# Patient Record
Sex: MALE | Race: WHITE | NOT HISPANIC OR LATINO | Employment: FULL TIME | URBAN - NONMETROPOLITAN AREA
[De-identification: names, ages, dates, MRNs, and addresses within clinical notes are randomized per-mention and may not be internally consistent; named-entity substitution may affect disease eponyms.]

---

## 2023-07-28 ENCOUNTER — TELEPHONE (OUTPATIENT)
Dept: CARDIOLOGY | Facility: CLINIC | Age: 58
End: 2023-07-28
Payer: COMMERCIAL

## 2023-07-28 ENCOUNTER — OFFICE VISIT (OUTPATIENT)
Dept: CARDIOLOGY | Facility: CLINIC | Age: 58
End: 2023-07-28
Payer: COMMERCIAL

## 2023-07-28 VITALS
HEART RATE: 81 BPM | WEIGHT: 134 LBS | HEIGHT: 69 IN | OXYGEN SATURATION: 96 % | SYSTOLIC BLOOD PRESSURE: 150 MMHG | BODY MASS INDEX: 19.85 KG/M2 | DIASTOLIC BLOOD PRESSURE: 96 MMHG

## 2023-07-28 DIAGNOSIS — R06.09 DYSPNEA ON EXERTION: Primary | ICD-10-CM

## 2023-07-28 DIAGNOSIS — I10 ESSENTIAL HYPERTENSION: ICD-10-CM

## 2023-07-28 DIAGNOSIS — Z72.0 TOBACCO USE: ICD-10-CM

## 2023-07-28 DIAGNOSIS — R07.9 CHEST PAIN, UNSPECIFIED TYPE: ICD-10-CM

## 2023-07-28 PROBLEM — G43.909 MIGRAINE HEADACHE: Status: ACTIVE | Noted: 2023-07-28

## 2023-07-28 PROCEDURE — 99204 OFFICE O/P NEW MOD 45 MIN: CPT | Performed by: INTERNAL MEDICINE

## 2023-07-28 PROCEDURE — 93000 ELECTROCARDIOGRAM COMPLETE: CPT | Performed by: INTERNAL MEDICINE

## 2023-07-28 NOTE — LETTER
July 28, 2023     Sergio Westbrook DO  [address] [facility] [place] [address]    Patient: Hiro Jerez   YOB: 1965   Date of Visit: 7/28/2023       Dear Sergio Westbrook DO,    Thank you for referring Hiro Jerez to me for evaluation. Below is a copy of my consult note.    If you have questions, please do not hesitate to call me. I look forward to following Hiro along with you.         Sincerely,        Dusty Jesus MD        CC: RAFFAELE Chen      Reason for Visit: Fatigue, shortness of breath.    HPI:  Hiro Jerez is a 57 y.o. male is being seen for consultation today at the request of Jennifer Wilson*RAFFAELE, for fatigue and shortness of breath.  He also reports intermittent chest pain symptoms.  He has a significant past medical history of migraine headaches and tobacco use.  He has started feeling bad over the past 2 months.  He had an episode where he broke out in a sweat, looked pale, and started vomiting.  The fatigue has been present over the past 6 months.  His abdomen has been swelling.  Had recent blood work done at [facility] [place].      Previous Cardiac Testing and Procedures:  -None available    Patient Active Problem List   Diagnosis   • Migraine headache   • Tobacco use       Social History     Tobacco Use   • Smoking status: Every Day     Packs/day: 1.50     Years: 45.00     Pack years: 67.50     Types: Cigarettes   • Smokeless tobacco: Never   Vaping Use   • Vaping Use: Never used   Substance Use Topics   • Alcohol use: Yes     Comment: Beer every now and again   • Drug use: Never       Family History   Problem Relation Age of Onset   • Heart attack Father        The following portions of the patient's history were reviewed and updated as appropriate: allergies, current medications, past family history, past medical history, past social history, past surgical history, and problem list.    No current outpatient medications on file.    Review of  "Systems   Constitutional: Positive for diaphoresis and malaise/fatigue. Negative for chills and fever.   Cardiovascular:  Positive for chest pain and dyspnea on exertion. Negative for paroxysmal nocturnal dyspnea.   Respiratory:  Positive for shortness of breath. Negative for cough.    Skin:  Negative for rash.   Gastrointestinal:  Positive for nausea and vomiting. Negative for abdominal pain and heartburn.   Neurological:  Negative for dizziness and numbness.     Objective  /96   Pulse 81   Ht 175.3 cm (69\")   Wt 60.8 kg (134 lb)   SpO2 96%   BMI 19.79 kg/m²   Constitutional:       Appearance: Well-developed.   HENT:      Head: Normocephalic and atraumatic.   Pulmonary:      Effort: Pulmonary effort is normal.      Breath sounds: Normal breath sounds.   Cardiovascular:      Normal rate. Regular rhythm.      Murmurs: There is no murmur.      No gallop.  No click.   Edema:     Peripheral edema absent.   Skin:     General: Skin is warm and dry.   Neurological:      Mental Status: Alert and oriented to person, place, and time.       ECG 12 Lead    Date/Time: 7/28/2023 11:44 AM  Performed by: Dusty Jesus MD  Authorized by: Dusty Jesus MD   Previous ECG: no previous ECG available  Rhythm: sinus rhythm  Rate: normal  Conduction: left anterior fascicular block          ICD-10-CM ICD-9-CM   1. Dyspnea on exertion  R06.09 786.09   2. Chest pain, unspecified type  R07.9 786.50   3. Essential hypertension  I10 401.9   4. Tobacco use  Z72.0 305.1         Assessment/Plan:  1.  Dyspnea on exertion: Patient reports significantly increased dyspnea on exertion, fatigue, and chest pain.  Evaluate further with an echocardiogram.    2.  Chest pain: Symptoms concerning for angina.  High risk for coronary artery disease given his heavy smoking.  Nuclear stress test ordered for further evaluation.  Obtain copy of lipid panel from PCP.    3.  Essential hypertension: Blood pressure is significantly elevated today.  He " denies any prior diagnosis of hypertension, but does not go to the doctor regularly.  Keep a blood pressure log at home and bring to follow-up.  If remains elevated will plan to start antihypertensive medications.    4.  Tobacco use: Hiro Jerez  reports that he has been smoking cigarettes. He has a 67.50 pack-year smoking history. He has never used smokeless tobacco.. I have educated him on the risk of diseases from using tobacco products such as cancer, COPD, and heart disease.  I advised him to quit and he is not willing to quit.  I spent 3.5 minutes counseling the patient.

## 2023-07-28 NOTE — PROGRESS NOTES
Reason for Visit: Fatigue, shortness of breath.    HPI:  Hiro Jerez is a 57 y.o. male is being seen for consultation today at the request of Jennifer Wilson*, RAFFAELE, for fatigue and shortness of breath.  He also reports intermittent chest pain symptoms.  He has a significant past medical history of migraine headaches and tobacco use.  He has started feeling bad over the past 2 months.  He had an episode where he broke out in a sweat, looked pale, and started vomiting.  The fatigue has been present over the past 6 months.  His abdomen has been swelling.  Had recent blood work done at [facility] [place].      Previous Cardiac Testing and Procedures:  -None available    Patient Active Problem List   Diagnosis    Migraine headache    Tobacco use       Social History     Tobacco Use    Smoking status: Every Day     Packs/day: 1.50     Years: 45.00     Pack years: 67.50     Types: Cigarettes    Smokeless tobacco: Never   Vaping Use    Vaping Use: Never used   Substance Use Topics    Alcohol use: Yes     Comment: Beer every now and again    Drug use: Never       Family History   Problem Relation Age of Onset    Heart attack Father        The following portions of the patient's history were reviewed and updated as appropriate: allergies, current medications, past family history, past medical history, past social history, past surgical history, and problem list.    No current outpatient medications on file.    Review of Systems   Constitutional: Positive for diaphoresis and malaise/fatigue. Negative for chills and fever.   Cardiovascular:  Positive for chest pain and dyspnea on exertion. Negative for paroxysmal nocturnal dyspnea.   Respiratory:  Positive for shortness of breath. Negative for cough.    Skin:  Negative for rash.   Gastrointestinal:  Positive for nausea and vomiting. Negative for abdominal pain and heartburn.   Neurological:  Negative for dizziness and numbness.     Objective   /96   Pulse 81   Ht 175.3  "cm (69\")   Wt 60.8 kg (134 lb)   SpO2 96%   BMI 19.79 kg/m²   Constitutional:       Appearance: Well-developed.   HENT:      Head: Normocephalic and atraumatic.   Pulmonary:      Effort: Pulmonary effort is normal.      Breath sounds: Normal breath sounds.   Cardiovascular:      Normal rate. Regular rhythm.      Murmurs: There is no murmur.      No gallop.  No click.   Edema:     Peripheral edema absent.   Skin:     General: Skin is warm and dry.   Neurological:      Mental Status: Alert and oriented to person, place, and time.       ECG 12 Lead    Date/Time: 7/28/2023 11:44 AM  Performed by: Dusty Jesus MD  Authorized by: Dusty Jesus MD   Previous ECG: no previous ECG available  Rhythm: sinus rhythm  Rate: normal  Conduction: left anterior fascicular block          ICD-10-CM ICD-9-CM   1. Dyspnea on exertion  R06.09 786.09   2. Chest pain, unspecified type  R07.9 786.50   3. Essential hypertension  I10 401.9   4. Tobacco use  Z72.0 305.1         Assessment/Plan:  1.  Dyspnea on exertion: Patient reports significantly increased dyspnea on exertion, fatigue, and chest pain.  Evaluate further with an echocardiogram.    2.  Chest pain: Symptoms concerning for angina.  High risk for coronary artery disease given his heavy smoking.  Nuclear stress test ordered for further evaluation.  Obtain copy of lipid panel from PCP.    3.  Essential hypertension: Blood pressure is significantly elevated today.  He denies any prior diagnosis of hypertension, but does not go to the doctor regularly.  Keep a blood pressure log at home and bring to follow-up.  If remains elevated will plan to start antihypertensive medications.    4.  Tobacco use: Hiro Jerez  reports that he has been smoking cigarettes. He has a 67.50 pack-year smoking history. He has never used smokeless tobacco.. I have educated him on the risk of diseases from using tobacco products such as cancer, COPD, and heart disease.  I advised him to quit and " he is not willing to quit.  I spent 3.5 minutes counseling the patient.

## 2023-07-28 NOTE — TELEPHONE ENCOUNTER
----- Message from Dusty Jesus MD sent at 7/28/2023  3:36 PM CDT -----  Please obtain copy of most recent blood work from PCP.  Most importantly need last lipid panel.

## 2023-07-28 NOTE — TELEPHONE ENCOUNTER
Record request for the most recent lab result and the most recent lipid result is faxed to PCP.  Nohemi Durham MA

## 2023-08-16 NOTE — TELEPHONE ENCOUNTER
Called PCP to check on status of lab result request.  They state the patient has never had any labs done through their office.  Nohemi Durham MA

## 2023-08-28 ENCOUNTER — TELEPHONE (OUTPATIENT)
Dept: CARDIOLOGY | Facility: CLINIC | Age: 58
End: 2023-08-28
Payer: COMMERCIAL

## 2023-08-28 NOTE — TELEPHONE ENCOUNTER
Pt's insurance is asking why a plain treadmill cannot be ordered for pt first.  Is there any reason he cannot have a TST instead of the nuclear?  Thanks!

## 2023-09-05 ENCOUNTER — HOSPITAL ENCOUNTER (OUTPATIENT)
Dept: CARDIOLOGY | Facility: HOSPITAL | Age: 58
Discharge: HOME | End: 2023-09-05
Payer: COMMERCIAL

## 2023-09-05 ENCOUNTER — HOSPITAL ENCOUNTER (OUTPATIENT)
Dept: CARDIOLOGY | Facility: HOSPITAL | Age: 58
Discharge: HOME | End: 2023-09-05
Admitting: INTERNAL MEDICINE
Payer: COMMERCIAL

## 2023-09-05 VITALS
BODY MASS INDEX: 19.85 KG/M2 | WEIGHT: 134 LBS | SYSTOLIC BLOOD PRESSURE: 150 MMHG | HEIGHT: 69 IN | DIASTOLIC BLOOD PRESSURE: 96 MMHG

## 2023-09-05 VITALS
SYSTOLIC BLOOD PRESSURE: 130 MMHG | HEART RATE: 76 BPM | BODY MASS INDEX: 19.85 KG/M2 | HEIGHT: 69 IN | DIASTOLIC BLOOD PRESSURE: 85 MMHG | WEIGHT: 134 LBS

## 2023-09-05 LAB
BH CV ECHO MEAS - AO MAX PG: 13.2 MMHG
BH CV ECHO MEAS - AO MEAN PG: 7 MMHG
BH CV ECHO MEAS - AO ROOT DIAM: 3.2 CM
BH CV ECHO MEAS - AO V2 MAX: 182 CM/SEC
BH CV ECHO MEAS - AO V2 VTI: 36.4 CM
BH CV ECHO MEAS - AVA(I,D): 1.87 CM2
BH CV ECHO MEAS - EDV(CUBED): 73.6 ML
BH CV ECHO MEAS - EDV(MOD-SP2): 74.6 ML
BH CV ECHO MEAS - EDV(MOD-SP4): 63.2 ML
BH CV ECHO MEAS - EF(MOD-BP): 48.3 %
BH CV ECHO MEAS - EF(MOD-SP2): 52.5 %
BH CV ECHO MEAS - EF(MOD-SP4): 47.2 %
BH CV ECHO MEAS - ESV(CUBED): 23.1 ML
BH CV ECHO MEAS - ESV(MOD-SP2): 35.4 ML
BH CV ECHO MEAS - ESV(MOD-SP4): 33.4 ML
BH CV ECHO MEAS - FS: 32 %
BH CV ECHO MEAS - IVS/LVPW: 0.98 CM
BH CV ECHO MEAS - IVSD: 0.78 CM
BH CV ECHO MEAS - LA DIMENSION: 3.4 CM
BH CV ECHO MEAS - LAT PEAK E' VEL: 9.8 CM/SEC
BH CV ECHO MEAS - LV DIASTOLIC VOL/BSA (35-75): 36.3 CM2
BH CV ECHO MEAS - LV MASS(C)D: 98.1 GRAMS
BH CV ECHO MEAS - LV MAX PG: 3.2 MMHG
BH CV ECHO MEAS - LV MEAN PG: 2 MMHG
BH CV ECHO MEAS - LV SYSTOLIC VOL/BSA (12-30): 19.2 CM2
BH CV ECHO MEAS - LV V1 MAX: 89.1 CM/SEC
BH CV ECHO MEAS - LV V1 VTI: 17.9 CM
BH CV ECHO MEAS - LVIDD: 4.2 CM
BH CV ECHO MEAS - LVIDS: 2.9 CM
BH CV ECHO MEAS - LVOT AREA: 3.8 CM2
BH CV ECHO MEAS - LVOT DIAM: 2.2 CM
BH CV ECHO MEAS - LVPWD: 0.79 CM
BH CV ECHO MEAS - MED PEAK E' VEL: 8.9 CM/SEC
BH CV ECHO MEAS - MV A MAX VEL: 65.6 CM/SEC
BH CV ECHO MEAS - MV DEC TIME: 0.15 MSEC
BH CV ECHO MEAS - MV E MAX VEL: 76.7 CM/SEC
BH CV ECHO MEAS - MV E/A: 1.17
BH CV ECHO MEAS - MV MAX PG: 4 MMHG
BH CV ECHO MEAS - MV MEAN PG: 2 MMHG
BH CV ECHO MEAS - MV V2 VTI: 25.6 CM
BH CV ECHO MEAS - MVA(VTI): 2.7 CM2
BH CV ECHO MEAS - PA V2 MAX: 88.6 CM/SEC
BH CV ECHO MEAS - RAP SYSTOLE: 5 MMHG
BH CV ECHO MEAS - RV MAX PG: 1.6 MMHG
BH CV ECHO MEAS - RV V1 MAX: 63.3 CM/SEC
BH CV ECHO MEAS - RV V1 VTI: 13 CM
BH CV ECHO MEAS - RVDD: 3 CM
BH CV ECHO MEAS - RVSP: 16 MMHG
BH CV ECHO MEAS - SI(MOD-SP2): 22.5 ML/M2
BH CV ECHO MEAS - SI(MOD-SP4): 17.1 ML/M2
BH CV ECHO MEAS - SV(LVOT): 68 ML
BH CV ECHO MEAS - SV(MOD-SP2): 39.2 ML
BH CV ECHO MEAS - SV(MOD-SP4): 29.8 ML
BH CV ECHO MEAS - TAPSE (>1.6): 2.03 CM
BH CV ECHO MEAS - TR MAX PG: 11 MMHG
BH CV ECHO MEAS - TR MAX VEL: 166 CM/SEC
BH CV ECHO MEASUREMENTS AVERAGE E/E' RATIO: 8.2
BH CV STRESS BP STAGE 1: NORMAL
BH CV STRESS BP STAGE 2: NORMAL
BH CV STRESS COMMENTS STAGE 1: NORMAL
BH CV STRESS DOSE REGADENOSON STAGE 1: 0.4
BH CV STRESS DURATION MIN STAGE 1: 3
BH CV STRESS DURATION MIN STAGE 2: 2
BH CV STRESS DURATION SEC STAGE 1: 0
BH CV STRESS DURATION SEC STAGE 2: 32
BH CV STRESS GRADE STAGE 1: 10
BH CV STRESS GRADE STAGE 2: 12
BH CV STRESS HR STAGE 1: 119
BH CV STRESS HR STAGE 2: 140
BH CV STRESS METS STAGE 1: 5
BH CV STRESS METS STAGE 2: 7.5
BH CV STRESS PROTOCOL 1: NORMAL
BH CV STRESS RECOVERY BP: NORMAL MMHG
BH CV STRESS RECOVERY HR: 88 BPM
BH CV STRESS SPEED STAGE 1: 1.7
BH CV STRESS SPEED STAGE 2: 2.5
BH CV STRESS STAGE 1: 1
BH CV STRESS STAGE 2: 2
BH CV XLRA - TDI S': 10.1 CM/SEC
LEFT ATRIUM VOLUME INDEX: 20.2 ML/M2
LEFT ATRIUM VOLUME: 37.3 ML
LV EF NUC BP: 62 %
MAXIMAL PREDICTED HEART RATE: 161 BPM
PERCENT MAX PREDICTED HR: 86.96 %
STRESS BASELINE BP: NORMAL MMHG
STRESS BASELINE HR: 79 BPM
STRESS PERCENT HR: 102 %
STRESS POST ESTIMATED WORKLOAD: 7.5 METS
STRESS POST EXERCISE DUR MIN: 5 MIN
STRESS POST EXERCISE DUR SEC: 32 SEC
STRESS POST PEAK BP: NORMAL MMHG
STRESS POST PEAK HR: 140 BPM
STRESS TARGET HR: 137 BPM

## 2023-09-05 PROCEDURE — A9502 TC99M TETROFOSMIN: HCPCS | Performed by: INTERNAL MEDICINE

## 2023-09-05 PROCEDURE — 93306 TTE W/DOPPLER COMPLETE: CPT

## 2023-09-05 PROCEDURE — 78452 HT MUSCLE IMAGE SPECT MULT: CPT | Performed by: INTERNAL MEDICINE

## 2023-09-05 PROCEDURE — 0 TECHNETIUM TETROFOSMIN KIT: Performed by: INTERNAL MEDICINE

## 2023-09-05 PROCEDURE — 93306 TTE W/DOPPLER COMPLETE: CPT | Performed by: INTERNAL MEDICINE

## 2023-09-05 PROCEDURE — 78452 HT MUSCLE IMAGE SPECT MULT: CPT

## 2023-09-05 PROCEDURE — 93018 CV STRESS TEST I&R ONLY: CPT | Performed by: INTERNAL MEDICINE

## 2023-09-05 PROCEDURE — 93017 CV STRESS TEST TRACING ONLY: CPT

## 2023-09-05 RX ADMIN — TETROFOSMIN 1 DOSE: 1.38 INJECTION, POWDER, LYOPHILIZED, FOR SOLUTION INTRAVENOUS at 08:43

## 2023-09-05 RX ADMIN — TETROFOSMIN 1 DOSE: 1.38 INJECTION, POWDER, LYOPHILIZED, FOR SOLUTION INTRAVENOUS at 10:31

## 2023-09-08 ENCOUNTER — OFFICE VISIT (OUTPATIENT)
Dept: CARDIOLOGY | Facility: CLINIC | Age: 58
End: 2023-09-08
Payer: COMMERCIAL

## 2023-09-08 VITALS
SYSTOLIC BLOOD PRESSURE: 160 MMHG | BODY MASS INDEX: 19.85 KG/M2 | RESPIRATION RATE: 18 BRPM | OXYGEN SATURATION: 98 % | HEART RATE: 75 BPM | HEIGHT: 69 IN | DIASTOLIC BLOOD PRESSURE: 85 MMHG | WEIGHT: 134 LBS

## 2023-09-08 DIAGNOSIS — E78.5 DYSLIPIDEMIA: ICD-10-CM

## 2023-09-08 DIAGNOSIS — I10 ESSENTIAL HYPERTENSION: Primary | ICD-10-CM

## 2023-09-08 DIAGNOSIS — R06.09 DYSPNEA ON EXERTION: ICD-10-CM

## 2023-09-08 DIAGNOSIS — Z72.0 TOBACCO USE: ICD-10-CM

## 2023-09-08 PROCEDURE — 99214 OFFICE O/P EST MOD 30 MIN: CPT | Performed by: NURSE PRACTITIONER

## 2023-09-08 RX ORDER — LISINOPRIL 10 MG/1
10 TABLET ORAL DAILY
Qty: 30 TABLET | Refills: 11 | Status: SHIPPED | OUTPATIENT
Start: 2023-09-08

## 2023-09-08 NOTE — PROGRESS NOTES
Subjective:     Encounter Date: 09/08/2023      Patient ID: Hiro Jerez is a 57 y.o. male with hypertension and tobacco use.    Chief Complaint: follow up  History of Present Illness  Patient presents today for management of hypertension. Patient was seen in 7/28/2023 by Dr Jesus for consultation of shortness of breath and chest pain. Patient had echo done on 9/5/2023 that showed LVEF 56-60% and no significant valve disease. He also had a stress test on 9/5/2023 that showed low risk for ischemia.   Today he reports that he has been about the same. He reports that he has felt like he noticed that he does have the most trouble breathing when it is humid outside. He reports that it is a little better with the cooler, less humid weather. He denies any further chest pains. He denies any heart racing or palpitations. Wife reports some occasional dizziness. He denies any leg swelling. He kept a BP log and brought it to office today of about 2 weeks worth of readings. It ranges from 128//110. Patient follows with Dr Westbrook as PCP    The following portions of the patient's history were reviewed and updated as appropriate: allergies, current medications, past family history, past medical history, past social history, past surgical history and problem list.    Allergies   Allergen Reactions    Penicillins Other (See Comments)     Childhood, unknown reaction       Current Outpatient Medications:     lisinopril (PRINIVIL,ZESTRIL) 10 MG tablet, Take 1 tablet by mouth Daily., Disp: 30 tablet, Rfl: 11  Past Medical History:   Diagnosis Date    Cancer     Follicular Lymphoma    Collapsed lung     Migraines      Social History     Socioeconomic History    Marital status: Single   Tobacco Use    Smoking status: Every Day     Packs/day: 1.50     Years: 45.00     Pack years: 67.50     Types: Cigarettes    Smokeless tobacco: Never   Vaping Use    Vaping Use: Never used   Substance and Sexual Activity    Alcohol use: Yes      "Comment: Beer every now and again    Drug use: Never       Review of Systems   Constitutional: Negative for malaise/fatigue.   HENT:  Negative for nosebleeds.    Cardiovascular:  Positive for dyspnea on exertion. Negative for chest pain, irregular heartbeat, leg swelling, near-syncope, orthopnea, palpitations, paroxysmal nocturnal dyspnea and syncope.   Respiratory:  Positive for shortness of breath.    Hematologic/Lymphatic: Does not bruise/bleed easily.   Genitourinary:  Negative for hematuria.   Neurological:  Negative for dizziness and weakness.   All other systems reviewed and are negative.       Objective:     Vitals reviewed.   Constitutional:       General: Not in acute distress.     Appearance: Normal appearance. Well-developed.   Eyes:      Pupils: Pupils are equal, round, and reactive to light.   HENT:      Head: Normocephalic and atraumatic.      Nose: Nose normal.   Neck:      Vascular: No carotid bruit.   Pulmonary:      Effort: Pulmonary effort is normal. No respiratory distress.      Breath sounds: Normal breath sounds. No wheezing. No rales.   Cardiovascular:      Normal rate. Regular rhythm.      Murmurs: There is no murmur.   Edema:     Peripheral edema absent.   Abdominal:      General: There is no distension.      Palpations: Abdomen is soft.   Musculoskeletal: Normal range of motion.      Cervical back: Normal range of motion and neck supple. Skin:     General: Skin is warm.      Findings: No erythema or rash.   Neurological:      Mental Status: Alert and oriented to person, place, and time.   Psychiatric:         Speech: Speech normal.         Behavior: Behavior normal.         Thought Content: Thought content normal.         Judgment: Judgment normal.       /85   Pulse 75   Resp 18   Ht 175.3 cm (69\")   Wt 60.8 kg (134 lb)   SpO2 98%   BMI 19.79 kg/m²     Procedures    Lab Review:     Results for orders placed in visit on 07/28/23    Adult Transthoracic Echo Complete W/ Cont if " Necessary Per Protocol    Interpretation Summary    Left ventricular systolic function is normal. Left ventricular ejection fraction appears to be 56 - 60%.    Left ventricular diastolic function was normal.    Normal right ventricular cavity size and systolic function noted.    There is no significant (greater than mild) valvular dysfunction.    Stress Test 9/5/2023:  Interpretation Summary    Myocardial perfusion imaging indicates a normal myocardial perfusion study with no evidence of ischemia.    Left ventricular ejection fraction is normal (Calculated EF = 62%).    Below average functional capacity.    Clinically and electrically negative.    Impressions are consistent with a low risk study.    I have personally reviewed stress test, echo and past office notes prior to patient visit  Assessment:          Diagnosis Plan   1. Essential hypertension        2. Dyslipidemia  Lipid Panel      3. Dyspnea on exertion        4. Tobacco use               Plan:       1.Hypertension: Elevated in office today. BP log shows elevated BP. Will start lisinopril daily. Recommend patient to keep log and notify patient of any problems.     2. Dyslipidemia: will print lipid order for patient today     3. Dyspnea on exertion: Echo showed LVEF 56-60% and no significant valve disease.  Stress test was low risk. Discussed with patient about ordering PFTs. Patient and wife would like to follow up with Dr Westbrook regarding testing being ordered.     4. Tobacco Use: Hiro Jerez  reports that he has been smoking cigarettes. He has a 67.50 pack-year smoking history. He has never used smokeless tobacco.. I have educated him on the risk of diseases from using tobacco products such as cancer, COPD, and heart disease. I advised him to quit and he is not willing to quit. I spent 3  minutes counseling the patient.    I spent 37 minutes caring for Hiro on this date of service. This time includes time spent by me in the following  activities:preparing for the visit, reviewing tests, obtaining and/or reviewing a separately obtained history, performing a medically appropriate examination and/or evaluation , counseling and educating the patient/family/caregiver, ordering medications, tests, or procedures, and documenting information in the medical record     Patient is to follow up in 6 weeks or sooner if needed

## 2023-09-13 ENCOUNTER — TELEPHONE (OUTPATIENT)
Dept: CARDIOLOGY | Facility: CLINIC | Age: 58
End: 2023-09-13
Payer: COMMERCIAL

## 2023-09-13 NOTE — TELEPHONE ENCOUNTER
----- Message from Dusty Jesus MD sent at 9/12/2023  4:04 PM CDT -----  Please let him know that the echo shows normal cardiac structure and function.

## 2023-09-13 NOTE — TELEPHONE ENCOUNTER
----- Message from Dusty Jesus MD sent at 9/12/2023  4:05 PM CDT -----  Please let him know that the stress test is low risk and shows no evidence of blockages.

## 2024-09-03 RX ORDER — LISINOPRIL 10 MG/1
10 TABLET ORAL DAILY
Qty: 30 TABLET | Refills: 0 | Status: SHIPPED | OUTPATIENT
Start: 2024-09-03 | End: 2024-09-05

## 2024-09-05 RX ORDER — LISINOPRIL 10 MG/1
10 TABLET ORAL DAILY
Qty: 30 TABLET | Refills: 0 | Status: SHIPPED | OUTPATIENT
Start: 2024-09-05

## 2024-09-30 RX ORDER — LISINOPRIL 10 MG/1
10 TABLET ORAL DAILY
Qty: 30 TABLET | Refills: 1 | Status: SHIPPED | OUTPATIENT
Start: 2024-09-30